# Patient Record
Sex: FEMALE | URBAN - METROPOLITAN AREA
[De-identification: names, ages, dates, MRNs, and addresses within clinical notes are randomized per-mention and may not be internally consistent; named-entity substitution may affect disease eponyms.]

---

## 2019-02-12 ENCOUNTER — APPOINTMENT (RX ONLY)
Dept: URBAN - METROPOLITAN AREA CLINIC 108 | Facility: CLINIC | Age: 49
Setting detail: DERMATOLOGY
End: 2019-02-12

## 2019-02-12 DIAGNOSIS — L40.8 OTHER PSORIASIS: ICD-10-CM

## 2019-02-12 DIAGNOSIS — L40.59 OTHER PSORIATIC ARTHROPATHY: ICD-10-CM

## 2019-02-12 DIAGNOSIS — L40.0 PSORIASIS VULGARIS: ICD-10-CM

## 2019-02-12 PROCEDURE — ? ORDER TESTS

## 2019-02-12 PROCEDURE — ? PRESCRIPTION

## 2019-02-12 PROCEDURE — ? OTHER

## 2019-02-12 PROCEDURE — 99202 OFFICE O/P NEW SF 15 MIN: CPT

## 2019-02-12 PROCEDURE — ? COUNSELING

## 2019-02-12 RX ORDER — CLOBETASOL PROPIONATE 0.46 MG/ML
SOLUTION TOPICAL
Qty: 1 | Refills: 2 | Status: ERX | COMMUNITY
Start: 2019-02-12

## 2019-02-12 RX ORDER — CLOBETASOL PROPIONATE 0.5 MG/G
CREAM TOPICAL BID
Qty: 2 | Refills: 1 | Status: ERX | COMMUNITY
Start: 2019-02-12

## 2019-02-12 RX ADMIN — CLOBETASOL PROPIONATE: 0.5 CREAM TOPICAL at 22:37

## 2019-02-12 RX ADMIN — CLOBETASOL PROPIONATE: 0.46 SOLUTION TOPICAL at 22:35

## 2019-02-12 ASSESSMENT — LOCATION SIMPLE DESCRIPTION DERM: LOCATION SIMPLE: RIGHT 4TH TOE

## 2019-02-12 ASSESSMENT — BSA PSORIASIS: % BODY COVERED IN PSORIASIS: 7

## 2019-02-12 ASSESSMENT — LOCATION ZONE DERM: LOCATION ZONE: TOE

## 2019-02-12 ASSESSMENT — LOCATION DETAILED DESCRIPTION DERM: LOCATION DETAILED: RIGHT DORSAL 4TH TOE

## 2019-02-12 NOTE — PROCEDURE: OTHER
Other (Free Text): Patient will read more information in regards to medications discussed today. Patient to call us once she determines which medication she wants to move forward with.
Detail Level: Zone
Note Text (......Xxx Chief Complaint.): This diagnosis correlates with the

## 2019-02-12 NOTE — PROCEDURE: ORDER TESTS
Expected Date Of Service: 02/12/2019
Lab Facility: [id number]
Billing Type: Third-Party Bill
Performing Laboratory: [id number]
Bill For Surgical Tray: no